# Patient Record
Sex: FEMALE | URBAN - METROPOLITAN AREA
[De-identification: names, ages, dates, MRNs, and addresses within clinical notes are randomized per-mention and may not be internally consistent; named-entity substitution may affect disease eponyms.]

---

## 2019-12-17 ENCOUNTER — TELEPHONE (OUTPATIENT)
Dept: GASTROENTEROLOGY | Facility: CLINIC | Age: 24
End: 2019-12-17

## 2019-12-17 NOTE — TELEPHONE ENCOUNTER
Left message rica Dutton/ 's office of denial of referral. One reason being is dx is gerd and gastroparesis.  is currently focusing practice solely on esophageal consults due to a large volume of referrals. The second reason being that  is a PCP. Our referrals need to be from a Gi provider as  works only as a second opinion consultant and does not keep pts long term. This way the pt can return to ref gi provider when released.